# Patient Record
Sex: FEMALE | Race: WHITE | Employment: FULL TIME | URBAN - METROPOLITAN AREA
[De-identification: names, ages, dates, MRNs, and addresses within clinical notes are randomized per-mention and may not be internally consistent; named-entity substitution may affect disease eponyms.]

---

## 2017-01-11 LAB
CREATININE, EXTERNAL: 0.6
HBA1C MFR BLD HPLC: 5.4 %

## 2017-01-12 ENCOUNTER — OFFICE VISIT (OUTPATIENT)
Dept: INTERNAL MEDICINE CLINIC | Age: 62
End: 2017-01-12

## 2017-01-12 VITALS
DIASTOLIC BLOOD PRESSURE: 80 MMHG | HEIGHT: 64 IN | OXYGEN SATURATION: 100 % | WEIGHT: 181.2 LBS | RESPIRATION RATE: 16 BRPM | TEMPERATURE: 98.4 F | BODY MASS INDEX: 30.93 KG/M2 | SYSTOLIC BLOOD PRESSURE: 100 MMHG | HEART RATE: 65 BPM

## 2017-01-12 DIAGNOSIS — Z11.59 NEED FOR HEPATITIS C SCREENING TEST: ICD-10-CM

## 2017-01-12 DIAGNOSIS — I10 ESSENTIAL HYPERTENSION, BENIGN: ICD-10-CM

## 2017-01-12 DIAGNOSIS — R73.01 IFG (IMPAIRED FASTING GLUCOSE): Primary | ICD-10-CM

## 2017-01-12 RX ORDER — METFORMIN HYDROCHLORIDE 500 MG/1
TABLET, FILM COATED, EXTENDED RELEASE ORAL
COMMUNITY

## 2017-01-12 RX ORDER — TRAZODONE HYDROCHLORIDE 100 MG/1
100 TABLET ORAL
COMMUNITY

## 2017-01-12 NOTE — MR AVS SNAPSHOT
Visit Information Date & Time Provider Department Dept. Phone Encounter #  
 1/12/2017  8:30 AM Jigna Gutierrez, [address] [facility] [phone] [id number] Follow-up Instructions Return if symptoms worsen or fail to improve. Upcoming Health Maintenance Date Due Hepatitis C Screening 1955 Pneumococcal 19-64 Medium Risk (1 of 1 - PPSV23) 12/18/1976 PAP AKA CERVICAL CYTOLOGY 12/23/2017 BREAST CANCER SCRN MAMMOGRAM 1/5/2018 COLONOSCOPY 11/26/2019 DTaP/Tdap/Td series (2 - Td) 1/3/2022 Allergies as of 1/12/2017  Review Complete On: 1/12/2017 By: Brie Harris Severity Noted Reaction Type Reactions Neosporin [Benzalkonium Chloride] High 01/12/2017    Rash Facial rash, eye swelling Other Medication  10/14/2009    Nausea and Vomiting, Other (comments) SCALLOPS Current Immunizations  Reviewed on 1/3/2012 Name Date TDAP Vaccine 1/3/2012 Not reviewed this visit You Were Diagnosed With   
  
 Codes Comments IFG (impaired fasting glucose)    -  Primary ICD-10-CM: R73.01 
ICD-9-CM: 790.21 Need for hepatitis C screening test     ICD-10-CM: Z11.59 
ICD-9-CM: V73.89 Essential hypertension, benign     ICD-10-CM: I10 
ICD-9-CM: 401.1 Vitals BP Pulse Temp Resp Height(growth percentile) Weight(growth percentile) 100/80 (BP 1 Location: Right arm, BP Patient Position: Sitting) 65 98.4 °F (36.9 °C) (Oral) 16 5' 3.78\" (1.62 m) 181 lb 3.2 oz (82.2 kg) SpO2 BMI OB Status Smoking Status 100% 31.32 kg/m2 Menopause Never Smoker BMI and BSA Data Body Mass Index Body Surface Area  
 31.32 kg/m 2 1.92 m 2 Preferred Pharmacy Pharmacy Name Phone [id number] [place], [address] [phone] Your Updated Medication List  
  
   
This list is accurate as of: 1/12/17 10:06 AM.  Always use your most recent med list.  
  
  
  
  
 BENICAR 40 mg tablet Generic drug:  olmesartan Take 20 mg by mouth every morning. doxycycline 100 mg capsule Commonly known as:  Jaison Jacobsonon Take 100 mg by mouth two (2) times a day. Indications: LYME DISEASE PREVENTION  
  
 DUAC 1.2 %(1 % base) -5 % SR topical gel Generic drug:  clindamycin-benzoyl Peroxide  
as needed. hydroCHLOROthiazide 25 mg tablet Commonly known as:  HYDRODIURIL Take 25 mg by mouth every morning. LORazepam 1 mg tablet Commonly known as:  ATIVAN Take 1 tablet by mouth daily as needed for Anxiety. metFORMIN 500 mg Tg24 24 hour tablet Commonly known asChaneta Lie ER Take  by mouth. MULTIVITAMIN PO Take 1 Tab by mouth every morning. simvastatin 20 mg tablet Commonly known as:  ZOCOR Take 1 Tab by mouth nightly. traZODone 100 mg tablet Commonly known as:  Pacheco Felix Take 100 mg by mouth nightly. valACYclovir 1 gram tablet Commonly known as:  VALTREX Take 500 mg by mouth as needed. VITAMIN D3 2,000 unit Tab Generic drug:  cholecalciferol (vitamin D3) Take 4,000 Units by mouth daily. Follow-up Instructions Return if symptoms worsen or fail to improve. Introducing [facility] HE[facility]! Sean Alonzo introduces [employer] patient portal. Now you can access parts of your medical record, email your doctor's office, and request medication refills online. 1. In your internet browser, go to [email or web address]. [email or web address] 2. Click on the First Time User? Click Here link in the Sign In box. You will see the New Member Sign Up page. 3. Enter your [employer] Access Code exactly as it appears below. You will not need to use this code after youve completed the sign-up process. If you do not sign up before the expiration date, you must request a new code. · [employer] Access Code: [email or web address] Expires: 4/12/2017 10:06 AM 
 
 4. Enter the last four digits of your Social Security Number (xxxx) and Date of Birth (mm/dd/yyyy) as indicated and click Submit. You will be taken to the next sign-up page. 5. Create a Video Passports ID. This will be your [employer] Pass[employer] login ID and cannot be changed, so think of one that is secure and easy to remember. 6. Create a [employer] Pass[employer] password. You can change your password at any time. 7. Enter your Password Reset Question and Answer. This can be used at a later time if you forget your password. 8. Enter your e-mail address. You will receive e-mail notification when new information is available in [address] 9. Click Sign Up. You can now view and download portions of your medical record. 10. Click the Download Summary menu link to download a portable copy of your medical information. If you have questions, please visit the Frequently Asked Questions section of the [employer] website. Remember, [employer] is NOT to be used for urgent needs. For medical emergencies, dial 911. Now available from your iPhone and Android! Please provide this summary of care documentation to your next provider. Your primary care clinician is listed as MAAME RENTERIA. If you have any questions after today's visit, please call [phone].

## 2017-01-15 NOTE — PROGRESS NOTES
HISTORY OF PRESENT ILLNESS  Denisse Bonner is a 63 y.o. female. HPI Ramonita is seen today for follow up of blood sugar abnormalities. She has been doing well in general.    IFG vs diabetes. She has been started on Metformin by her gynecologist, Dr. Dominga Abreu. We received lab work and her A1c was 5.4%. Ramonita is hesitant to increase the dosage of the medication to (2) nightly, currently she is on (1). She denies any reaction and does feel better. I asked her to continue with this current regimen of (1) Metformin daily and return to see Dr. Dominga Abreu as directed. She has had symptoms consistent with vasomotor lability. She wonders if this is related to her blood sugar. She does not want to do fingerstick testing. Essential hypertension. Up to date with cardiology follow up. Review of systems notable for some floaters and poor sleep pattern. Dr. Dominga Abreu has prescribed Trazodone. We counseled for 20 minutes regarding blood sugar abnormalities, a 25 minute visit overall greater than 50% of the visit was spent in counseling. Reviewed medication recommendations as above. MedDATA/gwo           Review of Systems   Constitutional: Positive for weight loss. Eyes: Negative for blurred vision. Endo/Heme/Allergies: Negative for polydipsia. Physical Exam   Constitutional: No distress. Neurological: She is alert. Skin: Skin is warm and dry. Psychiatric: She has a normal mood and affect. Her behavior is normal.   Nursing note and vitals reviewed. ASSESSMENT and PLAN  Haley Wesley was seen today for medication evaluation. Diagnoses and all orders for this visit:    IFG (impaired fasting glucose)- Diet and exercise , See gynecologist as discussed/directed     Need for hepatitis C screening test    Essential hypertension, benign- See cardiologist as directed.      Other orders  -     Cancel: METABOLIC PANEL, COMPREHENSIVE  -     Cancel: CBC WITH AUTOMATED DIFF  -     Cancel: HEPATITIS C AB    This has been fully explained to the patient, who indicates understanding.

## 2017-08-09 ENCOUNTER — OFFICE VISIT (OUTPATIENT)
Dept: INTERNAL MEDICINE CLINIC | Age: 62
End: 2017-08-09

## 2017-08-09 ENCOUNTER — TELEPHONE (OUTPATIENT)
Dept: INTERNAL MEDICINE CLINIC | Age: 62
End: 2017-08-09

## 2017-08-09 VITALS
RESPIRATION RATE: 18 BRPM | SYSTOLIC BLOOD PRESSURE: 100 MMHG | BODY MASS INDEX: 29.16 KG/M2 | OXYGEN SATURATION: 99 % | TEMPERATURE: 98.4 F | DIASTOLIC BLOOD PRESSURE: 80 MMHG | HEART RATE: 63 BPM | WEIGHT: 170.8 LBS | HEIGHT: 64 IN

## 2017-08-09 DIAGNOSIS — L98.8 AGE-RELATED FACIAL WRINKLES: ICD-10-CM

## 2017-08-09 DIAGNOSIS — Z01.810 PRE-OPERATIVE CARDIOVASCULAR EXAMINATION: Primary | ICD-10-CM

## 2017-08-09 DIAGNOSIS — I10 ESSENTIAL HYPERTENSION, BENIGN: ICD-10-CM

## 2017-08-09 RX ORDER — BUPROPION HYDROCHLORIDE 150 MG/1
150 TABLET ORAL
COMMUNITY

## 2017-08-09 NOTE — TELEPHONE ENCOUNTER
----- Message from Isabelle Moore MD sent at 8/9/2017 10:11 AM EDT -----  Regarding: acs  Request faxes  - Dr Georgina Teran, cardiology, latest lab. May be [facility]. .... Called Dr Julio César Mcknight office - spoke with Richie Butler - requesting pt's latest labs. She will send request to nurse to fax today. Called Dr Rajan's office - spoke with  Johnie Ybarra - she states pt last labs were done back in June of 2010. Her last appt was in October 2016 but no labs done.    Will forward to MD.

## 2017-08-09 NOTE — LETTER
8/9/2017 10:15 AM 
 
Ms. Adri Rao [address] [place] [id number] Dear Delfino Hammans,  
     Many thanks for your help with Missy's care. Please see my enclosed consult note for specific recommendations. If I may help in any way, please feel free to call. Sincerely, Karlos Colon MD

## 2017-08-09 NOTE — PROGRESS NOTES
HISTORY OF PRESENT ILLNESS  Urmila Irizarry is a 63 y.o. female. HPI PREOPERATIVE CONSULTATION NOTE    Ramonita is seen today at the request of Dr. Leonard Choudhary for medical clearance prior to proposed cosmetic surgery. She is going to have the loose skin of her neck tightened. Surgery is scheduled for 8/28/17. Ramonita has been medically stable. She has had no hospitalizations, emergency room visit or serious illnesses. She had a calcium score done with her cardiologist and it was zero. He manages her blood pressure and it has been fine. She denies any cardiac symptoms such as shortness of breath or chest pain. Her only new problem is a recent diagnosis of insulin resistance for which she takes Metformin. This was managed by her gynecologist, Dr. Yamilet Gregory. Assessment:  She is at low risk for medical complication based on her current clinical stability as well as the nature of the proposed procedure. Recommendations:  -Follow anesthesia recommendations for medications specifically I would advise not taking HCTZ the morning of surgery as well as holding the dinnertime Metformin the night before surgery. She expresses an understanding.  -Call if I may help with any postoperative medical problem.  -I have requested recent lab studies from Dr. Yamilet Gregory.  -I requested her latest EKG from her cardiologist.   -Call if I may help with any postoperative medical questions or problems.  -Lazarus Grace sees me on an as-needed basis and will let me know if she has any need for further follow up. Otherwise, she will continue to see her regular specialists as directed.     MedDATA/gwo         Past Medical History:   Diagnosis Date    Arthritis     Environmental allergies     Fatty liver     biopst proven    Hypertension     Obesity, morbid ([address]     Other and unspecified hyperlipidemia     Shingles     Unspecified adverse effect of anesthesia 2009    DIFFICULTY AWAKENING, R/T DILAUDID     Past Surgical History:   Procedure Laterality Date    HX CHOLECYSTECTOMY      HX COLONOSCOPY  14    due 23    HX GI      liver biopsy    HX GI  2008    lap band    HX GI  2009    lap band removal    HX ORTHOPAEDIC  2013    right foot , tendon release    HX TONSILLECTOMY       Current Outpatient Prescriptions   Medication Sig    buPROPion XL (WELLBUTRIN XL) 150 mg tablet Take 150 mg by mouth every morning.  traZODone (DESYREL) 100 mg tablet Take 100 mg by mouth nightly.  metFORMIN (GLUMETZA ER) 500 mg TG24 24 hour tablet Take  by mouth.  LORazepam (ATIVAN) 1 mg tablet Take 1 tablet by mouth daily as needed for Anxiety.  doxycycline (MONODOX) 100 mg capsule Take 100 mg by mouth two (2) times a day. Indications: LYME DISEASE PREVENTION    cholecalciferol, vitamin D3, (VITAMIN D3) 2,000 unit tab Take 4,000 Units by mouth daily.  valACYclovir (VALTREX) 1 g tablet Take 500 mg by mouth as needed.  DUAC 1-5 % topical gel as needed.  olmesartan (BENICAR) 40 mg tablet Take 20 mg by mouth every morning.  hydrochlorothiazide (HYDRODIURIL) 25 mg tablet Take 25 mg by mouth every morning.  simvastatin (ZOCOR) 20 mg tablet Take 1 Tab by mouth nightly.  MULTIVITAMINS (MULTIVITAMIN PO) Take 1 Tab by mouth every morning. No current facility-administered medications for this visit. Allergies   Allergen Reactions    Neosporin [Benzalkonium Chloride] Rash     Facial rash, eye swelling    Other Medication Nausea and Vomiting and Other (comments)     SCALLOPS     Social History     Social History    Marital status: SINGLE     Spouse name: N/A    Number of children: N/A    Years of education: N/A     Occupational History    Not on file.      Social History Main Topics    Smoking status: Never Smoker    Smokeless tobacco: Never Used    Alcohol use Yes      Comment: occassionally    Drug use: No    Sexual activity: Not on file     Other Topics Concern    Not on file     Social History Narrative     Family History Problem Relation Age of Onset    Arthritis-osteo Mother     Heart Disease Father     Diabetes Father     Hypertension Father     Hypertension Brother     Anesth Problems Neg Hx          Review of Systems   Constitutional: Positive for weight loss. With Diet and exercise    Respiratory: Negative. Cardiovascular: Negative for chest pain, palpitations, leg swelling and PND. Gastrointestinal: Negative. Genitourinary: Negative. Musculoskeletal: Negative for myalgias. Neurological: Negative for focal weakness. Physical Exam   Constitutional: She is oriented to person, place, and time. She appears well-developed and well-nourished. No distress. HENT:   Head: Normocephalic and atraumatic. Right Ear: Tympanic membrane, external ear and ear canal normal.   Left Ear: Tympanic membrane, external ear and ear canal normal.   Eyes: EOM are normal. Pupils are equal, round, and reactive to light. Right eye exhibits no discharge. Left eye exhibits no discharge. Neck: Normal range of motion. Neck supple. Carotid bruit is not present. No thyromegaly present. Cardiovascular: Normal rate, regular rhythm, normal heart sounds and intact distal pulses. Exam reveals no gallop and no friction rub. No murmur heard. Pulmonary/Chest: Effort normal and breath sounds normal. No respiratory distress. She has no wheezes. She has no rales. Abdominal: Soft. Bowel sounds are normal. She exhibits no distension and no mass. There is no tenderness. There is no rebound and no guarding. Musculoskeletal: Normal range of motion. She exhibits no edema or tenderness. Lymphadenopathy:     She has no cervical adenopathy. Neurological: She is alert and oriented to person, place, and time. She has normal reflexes. Skin: Skin is warm and dry. No rash noted. Psychiatric: She has a normal mood and affect. Her behavior is normal.   Nursing note and vitals reviewed.       ASSESSMENT and PLAN  Diagnoses and all orders for this visit:    1. Pre-operative cardiovascular examination- as above    2. Essential hypertension, benign- Continue current regimen of prescription and / or OTC medications, See cardiologist as directed.      3. Age-related facial wrinkles - See surgeon as discussed/directed

## 2017-08-09 NOTE — MR AVS SNAPSHOT
Visit Information Date & Time Provider Department Dept. Phone Encounter #  
 8/9/2017  9:20 AM Ruben Mata MD [facility] [phone] [id number] Upcoming Health Maintenance Date Due Hepatitis C Screening 1955 Pneumococcal 19-64 Medium Risk (1 of 1 - PPSV23) 12/18/1976 INFLUENZA AGE 9 TO ADULT 8/1/2017 PAP AKA CERVICAL CYTOLOGY 12/23/2017 BREAST CANCER SCRN MAMMOGRAM 1/19/2019 COLONOSCOPY 11/26/2019 DTaP/Tdap/Td series (2 - Td) 1/3/2022 Allergies as of 8/9/2017  Review Complete On: 8/9/2017 By: Ruben Mata MD  
  
 Severity Noted Reaction Type Reactions Neosporin [Benzalkonium Chloride] High 01/12/2017    Rash Facial rash, eye swelling Other Medication  10/14/2009    Nausea and Vomiting, Other (comments) SCALLOPS Current Immunizations  Reviewed on 1/3/2012 Name Date TDAP Vaccine 1/3/2012 Not reviewed this visit You Were Diagnosed With   
  
 Codes Comments Pre-operative cardiovascular examination    -  Primary ICD-10-CM: Z01.810 ICD-9-CM: V72.81 Essential hypertension, benign     ICD-10-CM: I10 
ICD-9-CM: 401.1 Age-related facial wrinkles     ICD-10-CM: L90.8 ICD-9-CM: 701.8 Vitals BP Pulse Temp Resp Height(growth percentile) Weight(growth percentile) 100/80 (BP 1 Location: Right arm, BP Patient Position: Sitting) 63 98.4 °F (36.9 °C) (Oral) 18 5' 3.78\" (1.62 m) 170 lb 12.8 oz (77.5 kg) SpO2 BMI OB Status Smoking Status 99% 29.52 kg/m2 Menopause Never Smoker BMI and BSA Data Body Mass Index Body Surface Area  
 29.52 kg/m 2 1.87 m 2 Preferred Pharmacy Pharmacy Name Phone [id number] [place] [phone] Your Updated Medication List  
  
   
This list is accurate as of: 8/9/17 10:03 AM.  Always use your most recent med list.  
  
  
  
  
 BENICAR 40 mg tablet Generic drug:  olmesartan Take 20 mg by mouth every morning. doxycycline 100 mg capsule Commonly known as:  Geraline Block Take 100 mg by mouth two (2) times a day. Indications: LYME DISEASE PREVENTION  
  
 DUAC 1.2 %(1 % base) -5 % SR topical gel Generic drug:  clindamycin-benzoyl Peroxide  
as needed. hydroCHLOROthiazide 25 mg tablet Commonly known as:  HYDRODIURIL Take 25 mg by mouth every morning. LORazepam 1 mg tablet Commonly known as:  ATIVAN Take 1 tablet by mouth daily as needed for Anxiety. metFORMIN 500 mg Tg24 24 hour tablet Commonly known asLuane Bragg ER Take  by mouth. MULTIVITAMIN PO Take 1 Tab by mouth every morning. simvastatin 20 mg tablet Commonly known as:  ZOCOR Take 1 Tab by mouth nightly. traZODone 100 mg tablet Commonly known as:  Donneta Ansonia Take 100 mg by mouth nightly. valACYclovir 1 gram tablet Commonly known as:  VALTREX Take 500 mg by mouth as needed. VITAMIN D3 2,000 unit Tab Generic drug:  cholecalciferol (vitamin D3) Take 4,000 Units by mouth daily. WELLBUTRIN  mg tablet Generic drug:  buPROPion XL Take 150 mg by mouth every morning. Introducing \[facility]\"" & HEALTH SERVICES! [employer] introduces [employer] patient portal. Now you can access parts of your medical record, email your doctor's office, and request medication refills online. 1. In your internet browser, go to [email or web address] 2. Click on the First Time User? Click Here link in the Sign In box. You will see the New Member Sign Up page. 3. Enter your [employer] Access Code exactly as it appears below. You will not need to use this code after youve completed the sign-up process. If you do not sign up before the expiration date, you must request a new code. · [employer] Access Code: [email or web address] Expires: 11/7/2017 10:03 AM 
 
 4. Enter the last four digits of your Social Security Number (xxxx) and Date of Birth (mm/dd/yyyy) as indicated and click Submit. You will be taken to the next sign-up page. 5. Create a Grow[employer] ID. This will be your [employer] login ID and cannot be changed, so think of one that is secure and easy to remember. 6. Create a [employer] password. You can change your password at any time. 7. Enter your Password Reset Question and Answer. This can be used at a later time if you forget your password. 8. Enter your e-mail address. You will receive e-mail notification when new information is available in [address] 9. Click Sign Up. You can now view and download portions of your medical record. 10. Click the Download Summary menu link to download a portable copy of your medical information. If you have questions, please visit the Frequently Asked Questions section of the [employer] website. Remember, [employer] is NOT to be used for urgent needs. For medical emergencies, dial 911. Now available from your iPhone and Android! Please provide this summary of care documentation to your next provider. Your primary care clinician is listed as MAAME RENTERIA. If you have any questions after today's visit, please call [phone].

## 2017-08-09 NOTE — TELEPHONE ENCOUNTER
Called Dr Rajan's office - spoke with [facility] - requesting pt recent EKG. Was last done at her October 2016 appt. She will fax office note and EKG today.  Will forward to MD

## 2017-08-11 ENCOUNTER — DOCUMENTATION ONLY (OUTPATIENT)
Dept: INTERNAL MEDICINE CLINIC | Age: 62
End: 2017-08-11

## 2018-03-14 ENCOUNTER — HOSPITAL ENCOUNTER (OUTPATIENT)
Dept: ULTRASOUND IMAGING | Age: 63
Discharge: HOME | End: 2018-03-14
Attending: INTERNAL MEDICINE
Payer: COMMERCIAL

## 2018-03-14 DIAGNOSIS — E04.1 NONTOXIC UNINODULAR GOITER: ICD-10-CM

## 2018-03-14 PROCEDURE — 76536 US EXAM OF HEAD AND NECK: CPT

## 2019-05-10 ENCOUNTER — HOSPITAL ENCOUNTER (OUTPATIENT)
Dept: ULTRASOUND IMAGING | Age: 64
Discharge: HOME | End: 2019-05-10
Attending: INTERNAL MEDICINE
Payer: COMMERCIAL

## 2019-05-10 DIAGNOSIS — E04.2 MULTINODULAR THYROID: ICD-10-CM

## 2019-05-10 PROCEDURE — 76536 US EXAM OF HEAD AND NECK: CPT

## 2020-03-10 ENCOUNTER — HOSPITAL ENCOUNTER (OUTPATIENT)
Dept: ULTRASOUND IMAGING | Age: 65
Discharge: HOME | End: 2020-03-10
Attending: INTERNAL MEDICINE
Payer: COMMERCIAL

## 2020-03-10 DIAGNOSIS — E04.2 NONTOXIC MULTINODULAR GOITER: ICD-10-CM

## 2020-03-10 PROCEDURE — 76536 US EXAM OF HEAD AND NECK: CPT

## 2021-10-20 ENCOUNTER — TRANSCRIBE ORDER (OUTPATIENT)
Dept: SCHEDULING | Age: 66
End: 2021-10-20

## 2021-10-20 DIAGNOSIS — M84.369A STRESS FRACTURE OF TIBIA: Primary | ICD-10-CM

## 2021-10-28 ENCOUNTER — HOSPITAL ENCOUNTER (OUTPATIENT)
Dept: MRI IMAGING | Age: 66
Discharge: HOME | End: 2021-10-28
Attending: ORTHOPAEDIC SURGERY
Payer: COMMERCIAL

## 2021-10-28 DIAGNOSIS — M84.369A STRESS FRACTURE OF TIBIA: ICD-10-CM

## 2021-10-28 PROCEDURE — 73721 MRI JNT OF LWR EXTRE W/O DYE: CPT

## 2021-11-15 ENCOUNTER — OFFICE VISIT (OUTPATIENT)
Dept: ORTHOPEDIC SURGERY | Age: 66
End: 2021-11-15
Payer: COMMERCIAL

## 2021-11-15 DIAGNOSIS — S83.221A: ICD-10-CM

## 2021-11-15 DIAGNOSIS — S83.261A PERIPHERAL TEAR OF LATERAL MENISCUS OF RIGHT KNEE AS CURRENT INJURY, INITIAL ENCOUNTER: Primary | ICD-10-CM

## 2021-11-15 PROCEDURE — 99214 OFFICE O/P EST MOD 30 MIN: CPT | Performed by: ORTHOPAEDIC SURGERY

## 2021-11-15 NOTE — PROGRESS NOTES
Bettina Loco (: 1955) is a 63 y.o. female, patient, here for evaluation of the following chief complaint(s):  Knee Pain (right knee MRI)       HPI:    Patient returns to the office with recurrent discomfort of her right knee. She once again describes discomfort seems to be mostly located across the lateral aspect of the right knee. Allergies   Allergen Reactions    Neosporin [Benzalkonium Chloride] Rash     Facial rash, eye swelling    Other Medication Nausea and Vomiting and Other (comments)     SCALLOPS       Current Outpatient Medications   Medication Sig    buPROPion XL (WELLBUTRIN XL) 150 mg tablet Take 150 mg by mouth every morning.  traZODone (DESYREL) 100 mg tablet Take 100 mg by mouth nightly.  metFORMIN (GLUMETZA ER) 500 mg TG24 24 hour tablet Take  by mouth.  LORazepam (ATIVAN) 1 mg tablet Take 1 tablet by mouth daily as needed for Anxiety.  doxycycline (MONODOX) 100 mg capsule Take 100 mg by mouth two (2) times a day. Indications: LYME DISEASE PREVENTION    cholecalciferol, vitamin D3, (VITAMIN D3) 2,000 unit tab Take 4,000 Units by mouth daily.  valACYclovir (VALTREX) 1 g tablet Take 500 mg by mouth as needed.  DUAC 1-5 % topical gel as needed.  olmesartan (BENICAR) 40 mg tablet Take 20 mg by mouth every morning.  hydrochlorothiazide (HYDRODIURIL) 25 mg tablet Take 25 mg by mouth every morning.  simvastatin (ZOCOR) 20 mg tablet Take 1 Tab by mouth nightly.  MULTIVITAMINS (MULTIVITAMIN PO) Take 1 Tab by mouth every morning. No current facility-administered medications for this visit.        Past Medical History:   Diagnosis Date    Arthritis     Environmental allergies     Fatty liver     biopst proven    Hypertension     Obesity, morbid ([address]     Other and unspecified hyperlipidemia     Shingles     Unspecified adverse effect of anesthesia     DIFFICULTY AWAKENING, R/T DILAUDID        Past Surgical History:   Procedure Laterality Date  HX CHOLECYSTECTOMY      HX COLONOSCOPY  14    due 25    HX GI      liver biopsy    HX GI  2008    lap band    HX GI  2009    lap band removal    HX ORTHOPAEDIC  2013    right foot , tendon release    HX TONSILLECTOMY         Family History   Problem Relation Age of Onset    Arthritis-osteo Mother     Heart Disease Father     Diabetes Father     Hypertension Father     Hypertension Brother     Anesth Problems Neg Hx         Social History     Socioeconomic History    Marital status:      Spouse name: Not on file    Number of children: Not on file    Years of education: Not on file    Highest education level: Not on file   Occupational History    Not on file   Tobacco Use    Smoking status: Never Smoker    Smokeless tobacco: Never Used   Substance and Sexual Activity    Alcohol use: Yes     Comment: occassionally    Drug use: No    Sexual activity: Not on file   Other Topics Concern    Not on file   Social History Narrative    Not on file     Social Determinants of Health     Financial Resource Strain:     Difficulty of Paying Living Expenses: Not on file   Food Insecurity:     Worried About Running Out of Food in the Last Year: Not on file    [place] of Food in the Last Year: Not on file   Transportation Needs:     Lack of Transportation (Medical): Not on file    Lack of Transportation (Non-Medical):  Not on file   Physical Activity:     Days of Exercise per Week: Not on file    Minutes of Exercise per Session: Not on file   Stress:     Feeling of Stress : Not on file   Social Connections:     Frequency of Communication with Friends and Family: Not on file    Frequency of Social Gatherings with Friends and Family: Not on file    Attends Buddhist Services: Not on file    Active Member of Clubs or Organizations: Not on file    Attends Club or Organization Meetings: Not on file    Marital Status: Not on file   Intimate Partner Violence:     Fear of Current or Ex-Partner: Not on file    Emotionally Abused: Not on file    Physically Abused: Not on file    Sexually Abused: Not on file   Housing Stability:     Unable to Pay for Housing in the Last Year: Not on file    Number of Places Lived in the Last Year: Not on file    Unstable Housing in the Last Year: Not on file       Review of Systems   Constitutional: Negative. HENT: Negative. Eyes: Negative. Respiratory: Negative. Cardiovascular: Negative. Gastrointestinal: Negative. Endocrine: Negative. Genitourinary: Negative. Musculoskeletal: Negative. Shoulder MRI results   Skin: Negative. Allergic/Immunologic: Negative. Neurological: Negative. Hematological: Negative. Psychiatric/Behavioral: Negative. All other systems reviewed and are negative. Vitals: There were no vitals taken for this visit. There is no height or weight on file to calculate BMI. Ortho Exam     Right knee: Minimal effusion. She has full range of motion. Positive discomfort is noted to palpation along the lateral aspect of the knee. Patient also notes some mild discomfort along the medial aspect of the knee. No swelling noted. Neurovascular examination is intact. Left knee: no abrasions, lacerations, ecchymosis or soft tissue swelling. No effusion is identified. There is no pain to palpation along the medial or lateral border of the patella. There is no pain or crepitation with manipulation of the patella. There is normal excursion of the patella. Patellar grind test is negative. Active and passive range of motion is full and does not cause pain or crepitation. There is no pain with palpation along the medial femoral epicondyle or medial tibia and no pain with palpation over the lateral femoral epicondyle. There is no medial or lateral joint line tenderness. Gabe's maneuver is negative. There is no collateral ligament instability.   Anterior drawer, Lachman and posterior drawer are negative. There is no soft tissue swelling distally into the leg. MRI evaluation shows evidence of a horizontal degenerative tear with parameniscal cyst of the lateral meniscus. There also appears to be potentially radial tear of the posterior horn root of the medial meniscus    ASSESSMENT/PLAN:    Have gone over the findings with the patient. We discussed operative versus nonoperative management. Understand the risk and benefits of both, she is leaning a little bit more towards surgical management. I think it is reasonable. However, she would like to get through the holiday season. In the interim, vascular to ice and modify her activity. We did talk about surgical risks and benefits. The risks and benefits were described to the patient. The patient understands there is a risk of infection, postoperative pain, numbness, tingling, stiffness DVT, PE, MI, CVA and any other unforeseen events. The patient also understands there is a long rehabilitative process that typically follows the surgical procedure. We talked about the possibility of not being able to alleviate all of the discomfort. Also, I explained  there is no guarantee all function and strength will return. The patient understands the possiblity that  implants may be utilized during this surgery. If her pain gets worse, have asked her to call the office and potentially set up the surgery. I did remind her, if her medial meniscus does need work I would recommend medial meniscus root repair if the root is torn and this will require bracing. She understands this moving forward.       Carmen Fox MD

## 2021-12-03 DIAGNOSIS — S83.261A PERIPHERAL TEAR OF LATERAL MENISCUS OF RIGHT KNEE AS CURRENT INJURY, INITIAL ENCOUNTER: Primary | ICD-10-CM

## 2021-12-27 DIAGNOSIS — S83.261A PERIPHERAL TEAR OF LATERAL MENISCUS OF RIGHT KNEE AS CURRENT INJURY, INITIAL ENCOUNTER: Primary | ICD-10-CM

## 2021-12-27 RX ORDER — OXYCODONE AND ACETAMINOPHEN 5; 325 MG/1; MG/1
1 TABLET ORAL
Qty: 30 TABLET | Refills: 0 | Status: SHIPPED | OUTPATIENT
Start: 2021-12-27 | End: 2022-01-01

## 2022-01-03 ENCOUNTER — OFFICE VISIT (OUTPATIENT)
Dept: ORTHOPEDIC SURGERY | Age: 67
End: 2022-01-03
Payer: COMMERCIAL

## 2022-01-03 DIAGNOSIS — Z98.890 STATUS POST ARTHROSCOPY OF RIGHT KNEE: Primary | ICD-10-CM

## 2022-01-03 PROCEDURE — 99024 POSTOP FOLLOW-UP VISIT: CPT | Performed by: ORTHOPAEDIC SURGERY

## 2022-01-03 NOTE — PROGRESS NOTES
Rashawn Sierra (: 1955) is a 61 y.o. female, patient, here for evaluation of the following chief complaint(s):  No chief complaint on file. HPI:    Patient returns the office now status post right knee arthroscopy. Patient is doing well her pain is controlled. She is here today with her . She denies shortness of breath or calf pain. Allergies   Allergen Reactions    Neosporin [Benzalkonium Chloride] Rash     Facial rash, eye swelling    Other Medication Nausea and Vomiting and Other (comments)     SCALLOPS       Current Outpatient Medications   Medication Sig    buPROPion XL (WELLBUTRIN XL) 150 mg tablet Take 150 mg by mouth every morning.  traZODone (DESYREL) 100 mg tablet Take 100 mg by mouth nightly.  metFORMIN (GLUMETZA ER) 500 mg TG24 24 hour tablet Take  by mouth.  LORazepam (ATIVAN) 1 mg tablet Take 1 tablet by mouth daily as needed for Anxiety.  doxycycline (MONODOX) 100 mg capsule Take 100 mg by mouth two (2) times a day. Indications: LYME DISEASE PREVENTION    cholecalciferol, vitamin D3, (VITAMIN D3) 2,000 unit tab Take 4,000 Units by mouth daily.  valACYclovir (VALTREX) 1 g tablet Take 500 mg by mouth as needed.  DUAC 1-5 % topical gel as needed.  olmesartan (BENICAR) 40 mg tablet Take 20 mg by mouth every morning.  hydrochlorothiazide (HYDRODIURIL) 25 mg tablet Take 25 mg by mouth every morning.  simvastatin (ZOCOR) 20 mg tablet Take 1 Tab by mouth nightly.  MULTIVITAMINS (MULTIVITAMIN PO) Take 1 Tab by mouth every morning. No current facility-administered medications for this visit.        Past Medical History:   Diagnosis Date    Arthritis     Environmental allergies     Fatty liver     biopst proven    Hypertension     Obesity, morbid ([facility] [address]     Other and unspecified hyperlipidemia     Shingles     Unspecified adverse effect of anesthesia     DIFFICULTY AWAKENING, R/T DILAUDID        Past Surgical History:   Procedure Laterality Date    HX CHOLECYSTECTOMY      HX COLONOSCOPY  14    due 25    HX GI      liver biopsy    HX GI  2008    lap band    HX GI  2009    lap band removal    HX ORTHOPAEDIC  2013    right foot , tendon release    HX TONSILLECTOMY         Family History   Problem Relation Age of Onset    OSTEOARTHRITIS Mother     Heart Disease Father     Diabetes Father     Hypertension Father     Hypertension Brother     Anesth Problems Neg Hx         Social History     Socioeconomic History    Marital status:      Spouse name: Not on file    Number of children: Not on file    Years of education: Not on file    Highest education level: Not on file   Occupational History    Not on file   Tobacco Use    Smoking status: Never Smoker    Smokeless tobacco: Never Used   Substance and Sexual Activity    Alcohol use: Yes     Comment: occassionally    Drug use: No    Sexual activity: Not on file   Other Topics Concern    Not on file   Social History Narrative    Not on file     Social Determinants of Health     Financial Resource Strain:     Difficulty of Paying Living Expenses: Not on file   Food Insecurity:     Worried About Running Out of Food in the Last Year: Not on file    [place] of Food in the Last Year: Not on file   Transportation Needs:     Lack of Transportation (Medical): Not on file    Lack of Transportation (Non-Medical):  Not on file   Physical Activity:     Days of Exercise per Week: Not on file    Minutes of Exercise per Session: Not on file   Stress:     Feeling of Stress : Not on file   Social Connections:     Frequency of Communication with Friends and Family: Not on file    Frequency of Social Gatherings with Friends and Family: Not on file    Attends Voodoo Services: Not on file    Active Member of Clubs or Organizations: Not on file    Attends Club or Organization Meetings: Not on file    Marital Status: Not on file   Intimate Partner Violence:     Fear of Current or Ex-Partner: Not on file    Emotionally Abused: Not on file    Physically Abused: Not on file    Sexually Abused: Not on file   Housing Stability:     Unable to Pay for Housing in the Last Year: Not on file    Number of Places Lived in the Last Year: Not on file    Unstable Housing in the Last Year: Not on file       Review of Systems    Vitals: There were no vitals taken for this visit. There is no height or weight on file to calculate BMI. Ortho Exam     Right knee: Mild ecchymosis noted along the anterior aspect of the knee. Portal sites are healing well. Range of motion 0-120 degrees. She has no effusion. Calf is soft and supple. Neurovascular examination is intact. ASSESSMENT/PLAN:    Patient is doing well. Have gone over home exercise program.  I would also recommend structured physical therapy. She was provided a prescription for such. Patient is to return to the office in 4 weeks.         Luis Wright MD

## 2022-01-07 ENCOUNTER — OFFICE VISIT (OUTPATIENT)
Dept: ORTHOPEDIC SURGERY | Age: 67
End: 2022-01-07
Payer: COMMERCIAL

## 2022-01-07 DIAGNOSIS — M25.561 ACUTE PAIN OF RIGHT KNEE: Primary | ICD-10-CM

## 2022-01-07 DIAGNOSIS — R26.89 ANTALGIC GAIT: ICD-10-CM

## 2022-01-07 PROCEDURE — 97140 MANUAL THERAPY 1/> REGIONS: CPT | Performed by: PHYSICAL THERAPIST

## 2022-01-07 PROCEDURE — 97110 THERAPEUTIC EXERCISES: CPT | Performed by: PHYSICAL THERAPIST

## 2022-01-07 PROCEDURE — 97162 PT EVAL MOD COMPLEX 30 MIN: CPT | Performed by: PHYSICAL THERAPIST

## 2022-01-07 NOTE — PROGRESS NOTES
Devi Rocha (: 1955) is a 61 y.o. Knee Pain       Patient Name: Devi Rocha  Date:2022  : 1955  [x]  Patient  Verified  Payor: BLUE CROSS / Plan: [id number] PPO / Product Type: PPO /    Total Treatment Time (min): 70 minutes  Total Timed Codes (min): 60 minutes  1:1 Treatment Time (MC only): N/A  Visit #:  30      Treatment Area: Right knee    ASSESSMENT/PLAN:  Below is the assessment and plan developed based on review of pertinent history, physical exam, labs, studies, and medications. Patient comes in today status post right knee meniscectomy and chondroplasty on 2021 and presents with physical therapy deficits including swelling, range of motion, strength, mobility, flexibility, and balance. She will benefit from a physical therapy program to address above-mentioned deficits. Short-term goals: To become independent with today's prescribed home exercise program in 1 week. Long-term goals: To progress with a physical therapy program so the patient demonstrates functional right knee range of motion and strength allowing her to negotiate going up and down stairs and walk distances of up to 2 miles reporting no knee pain or instability in the next 4 to 8 weeks. Additionally patient will score clinically significant improvement of 20 percentage points on the lower extremity functional scale in the next 4 to 8 weeks. Patient will be seen twice a week for up to 20 visits  with focus on progressive restoration of range of motion and strength, balance, and functional mobility. Therapeutic applications will include but are not limited to:Manual therapy, joint mobilization, myofascial release, therapeutic exercises. Modalities including ultrasound and electric stimulation heat and ice. Kinesiotape and Clay taping for joint reeducation and approximation of tissue for neuromuscular reeducation. 1. Acute pain of right knee  2.  Antalgic gait      Return in about 4 days (around 1/11/2022). SUBJECTIVE:  HPI  Patient comes in today now 11 days status post right knee arthroscopy. She reports improvement since surgery. She has not been taking pain medication. She did start having more swelling and pain on Wednesday of this week and started to ice and elevate again. This has been helpful. Patient continues to work from home. She has not been driving. Does have a treadmill at her house that she is willing to use. Past medical history includes type 2 diabetes. She has lost 30 pounds in the last 3 months since changing her blood sugar medication. She is currently being seen by an endocrinologist for liver issues. Was seen last year in our clinic for rotator cuff repair. Her shoulder has done well. Please see patient's medical chart for detailed list of current medications as well as significant past medical history. OBJECTIVE:  Patient comes in today demonstrating slight antalgic gait. She demonstrates some weakness and apprehension with modified stepdown and step up testing on the right side. Menaced ability to balance on right extremity. Right knee: AROM measures 0 degrees extension and 120 degrees of flexion. There is 2.5 cm increase circumferential measurement at mid patella. Resolving ecchymosis around her knee. Sites are healing nicely with no signs of infection. Calf is nontender. Knee is stable with four-way ligamentous testing. Mild tenderness to medial joint line. Relative hypomobility to patellofemoral joint. Diminished quadriceps contraction. Flexibility restriction to quadriceps and gastroc. Lower extremity functional scale: 37/80    With today's interventions I performed patellofemoral and tibiofemoral mobilization with passive range of motion. Retrograde massage to peripatellar and quadriceps. I applied Kinesiotape around both medial and lateral patella with lengthening into quadriceps.  Manual hamstring stretch. We initiated exercises for range of motion, quad and hip strengthening, flexibility, and gait training for patient perform at home on a daily basis. Today's exercises include NuStep, straight leg raise, TKE, standing march, knee flexion stretch, and calf stretch. Ice posttreatment. An electronic signature was used to authenticate this note.   -- Gogo Boyle, PT

## 2022-01-12 ENCOUNTER — OFFICE VISIT (OUTPATIENT)
Dept: ORTHOPEDIC SURGERY | Age: 67
End: 2022-01-12
Payer: COMMERCIAL

## 2022-01-12 DIAGNOSIS — R26.89 ANTALGIC GAIT: ICD-10-CM

## 2022-01-12 DIAGNOSIS — M25.561 ACUTE PAIN OF RIGHT KNEE: Primary | ICD-10-CM

## 2022-01-12 PROCEDURE — 97110 THERAPEUTIC EXERCISES: CPT | Performed by: PHYSICAL THERAPIST

## 2022-01-12 PROCEDURE — 97140 MANUAL THERAPY 1/> REGIONS: CPT | Performed by: PHYSICAL THERAPIST

## 2022-01-12 NOTE — PROGRESS NOTES
PT DAILY TREATMENT NOTE    Patient Name: Carmen Bangura  Date:2022  : 1955  [x]  Patient  Verified  Payor: BLUE CROSS / Plan: [facility] PPO / Product Type: PPO /    Total Treatment Time (min): 60 minutes  Total Timed Codes (min): 60 minutes  Visit #: 2 of 30    Treatment Area: R Knee Post Op    ASSESSMENT/PLAN:  Relative patellofemoral joint hypomobility. I put her on the recumbent bike to help with patellofemoral joint mobility. - We will continue with physical therapy per current plan of care with progression towards functional goals. 1. Acute pain of right knee        Return in about 2 days (around 2022) for continued therapy for knee. SUBJECTIVE/OBJECTIVE:  HPI  Patient reports no difficulty with daily activities of living, she still has some pain at night. Physical Exam    Manual Interventions: (20 minutes)  Patellofemoral and tibiofemoral mobilization with passive range of motion. Retrograde massage to peripatellar and quadriceps. Passive Hamstring stretch       Therapeutic Exercise: (40 minutes)  Strength/Endurance/ADL function/Neuromuscular Reeducation/Exercises supervised and completed per the exercise flow sheet. Modalities Applied: (10 minutes)  Applied ice to the right knee post treatment.      Cotreatment by TIRSO Jacobs      PT Exercise Log         Activity/Exercise Date  22    Bike 10 minutes     Slant Board 2 minutes      Balance Board 2 minutes     TKE     3x10     Knee Flexion at the TM     2 minutes   Shuttle    2B  3x10     SLR  3x10  2#

## 2022-01-14 ENCOUNTER — OFFICE VISIT (OUTPATIENT)
Dept: ORTHOPEDIC SURGERY | Age: 67
End: 2022-01-14
Payer: COMMERCIAL

## 2022-01-14 DIAGNOSIS — R26.89 ANTALGIC GAIT: ICD-10-CM

## 2022-01-14 DIAGNOSIS — M25.561 ACUTE PAIN OF RIGHT KNEE: Primary | ICD-10-CM

## 2022-01-14 PROCEDURE — 97110 THERAPEUTIC EXERCISES: CPT | Performed by: PHYSICAL THERAPIST

## 2022-01-14 PROCEDURE — 97140 MANUAL THERAPY 1/> REGIONS: CPT | Performed by: PHYSICAL THERAPIST

## 2022-01-14 NOTE — PROGRESS NOTES
PT DAILY TREATMENT NOTE    Patient Name: Cristela Lynne  Date:2022  : 1955  [x]  Patient  Verified  Payor: BLUE CROSS / Plan: [facility] PPO / Product Type: PPO /    Total Treatment Time (min): 60 minutes  Total Timed Codes (min): 60 minutes  Visit #: 3 of 30    Treatment Area: R Knee Post Op    ASSESSMENT/PLAN:  Less knee effusion noted. Flexibility restriction to the hamstring. Patient was able to achieve 110 degrees of knee flexion following mobilizations. - We will continue with physical therapy per current plan of care with progression towards functional goals. 1. Acute pain of right knee        Return in about 5 days (around 2022) for continued therapy for knee. SUBJECTIVE/OBJECTIVE:  HPI  Patient is having less pain at rest.     Physical Exam    Manual Interventions: (20 minutes)  Patellofemoral and tibiofemoral mobilization with passive range of motion. Retrograde massage to peripatellar and quadriceps. Passive Hamstring stretch       Therapeutic Exercise: (40 minutes)  Strength/Endurance/ADL function/Neuromuscular Reeducation/Exercises supervised and completed per the exercise flow sheet. Modalities Applied:  Patient declined ice post treatment.      Cotreatment by TIRSO Willis      PT Exercise Log         Activity/Exercise Date  22    Bike 10 minutes     Slant Board 2 minutes      Balance Board 2 minutes     TKE     3x10     Knee Flexion at the TM     2 minutes   Shuttle    2B  3x10     SLR  3x10  2#   Stool Scoots   3 laps     Abd Walk Blue  3x10

## 2022-01-18 ENCOUNTER — OFFICE VISIT (OUTPATIENT)
Dept: ORTHOPEDIC SURGERY | Age: 67
End: 2022-01-18
Payer: COMMERCIAL

## 2022-01-18 DIAGNOSIS — M25.561 ACUTE PAIN OF RIGHT KNEE: Primary | ICD-10-CM

## 2022-01-18 DIAGNOSIS — R26.89 ANTALGIC GAIT: ICD-10-CM

## 2022-01-18 PROCEDURE — 97140 MANUAL THERAPY 1/> REGIONS: CPT | Performed by: PHYSICAL THERAPIST

## 2022-01-18 PROCEDURE — 97110 THERAPEUTIC EXERCISES: CPT | Performed by: PHYSICAL THERAPIST

## 2022-01-18 NOTE — PROGRESS NOTES
PT DAILY TREATMENT NOTE    Patient Name: Gennaro Santos  Date:2022  : 1955  [x]  Patient  Verified  Payor: BLUE CROSS / Plan: [facility] [id number] PPO / Product Type: PPO /    Total Treatment Time (min): 60 minutes  Total Timed Codes (min): 60 minutes  Visit #: 3 of 30    Treatment Area: R Knee Post Op    ASSESSMENT/PLAN:  Patient is progressing nicely in terms of less knee effusion, improved range of motion, and gait beginning to normalize. - We will continue with physical therapy per current plan of care with progression towards functional goals. 1. Acute pain of right knee      Return in about 3 days (around 2022) for continued therapy for knee. SUBJECTIVE/OBJECTIVE:  Knee Pain      Patient is having less pain at rest, less swelling, and the bruising is diminishing. Physical Exam    Manual Interventions: (20 minutes)  Patellofemoral and tibiofemoral mobilization with passive range of motion. Retrograde massage to peripatellar and quadriceps. Passive Hamstring stretch       Therapeutic Exercise: (40 minutes)  Strength/Endurance/ADL function/Neuromuscular Reeducation/Exercises supervised and completed per the exercise flow sheet. Modalities Applied:  Patient declined ice post treatment.      Cotreatment by TIRSO Melgar      PT Exercise Log         Activity/Exercise Date  22    Bike 10 minutes     Slant Board 2 minutes      Balance Board 2 minutes     TKE     3x10     Knee Flexion at the TM     2 minutes   Shuttle    2B  3x10     SLR  3x10  2#   Stool Scoots   3 laps     Abd Walk Blue  3x10

## 2022-01-24 ENCOUNTER — OFFICE VISIT (OUTPATIENT)
Dept: ORTHOPEDIC SURGERY | Age: 67
End: 2022-01-24
Payer: COMMERCIAL

## 2022-01-24 DIAGNOSIS — R26.89 ANTALGIC GAIT: ICD-10-CM

## 2022-01-24 DIAGNOSIS — M25.561 ACUTE PAIN OF RIGHT KNEE: Primary | ICD-10-CM

## 2022-01-24 PROCEDURE — 97110 THERAPEUTIC EXERCISES: CPT | Performed by: PHYSICAL THERAPIST

## 2022-01-24 PROCEDURE — 97140 MANUAL THERAPY 1/> REGIONS: CPT | Performed by: PHYSICAL THERAPIST

## 2022-01-24 NOTE — PROGRESS NOTES
PT DAILY TREATMENT NOTE    Patient Name: Nitza Dela Cruz  Date:2022  : 1955  [x]  Patient  Verified  Payor: BLUE CROSS / Plan: [facility] PPO / Product Type: PPO /    Total Treatment Time (min): 60 minutes  Total Timed Codes (min): 60 minutes  Visit #: 4 of 30    Treatment Area: R Knee Post Op    ASSESSMENT/PLAN:  Patient continues to have a slight antalgic gait. She is progressing well with both functional knee range of motion and strength. - We will continue with physical therapy per current plan of care with progression towards functional goals. 1. Acute pain of right knee  2. Antalgic gait      Return in about 2 days (around 2022) for continued therapy for knee. SUBJECTIVE/OBJECTIVE:  Knee Pain    Patient is still having some knee discomfort. Physical Exam    Manual Interventions: (20 minutes)  Patellofemoral and tibiofemoral mobilization with passive range of motion. Retrograde massage to peripatellar and quadriceps. Passive Hamstring stretch       Therapeutic Exercise: (40 minutes)  Strength/Endurance/ADL function/Neuromuscular Reeducation/Exercises supervised and completed per the exercise flow sheet. Modalities Applied:  Patient declined ice post treatment.      Cotreatment by Rowan Oppenheim, LPTA      PT Exercise Log         Activity/Exercise Date  22    Bike 10 minutes     Slant Board 2 minutes      Balance Board 2 minutes     TKE     3x10     Knee Flexion at the TM     2 minutes   Shuttle    2B  3x10     SLR  3x10  2#   Stool Scoots   3 laps     Abd Walk Blue  3x10                                                                                                                                                                                                                                                                                                         - We will continue with physical therapy per current plan of care with progression towards functional goals. 1. Acute pain of right knee  2. Antalgic gait      Return in about 2 days (around 1/26/2022) for continued therapy for knee. SUBJECTIVE/OBJECTIVE:  Knee Pain      Patient is having less pain at rest, less swelling, and the bruising is diminishing. Physical Exam    Manual Interventions: (20 minutes)  Patellofemoral and tibiofemoral mobilization with passive range of motion. Retrograde massage to peripatellar and quadriceps. Passive Hamstring stretch       Therapeutic Exercise: (40 minutes)  Strength/Endurance/ADL function/Neuromuscular Reeducation/Exercises supervised and completed per the exercise flow sheet. Modalities Applied:  Patient declined ice post treatment.      Cotreatment by TIRSO Rincon      PT Exercise Log         Activity/Exercise Date  01/25/22    Bike 10 minutes     Slant Board 2 minutes      Balance Board 2 minutes     TKE     3x10     Knee Flexion at the TM     2 minutes   Shuttle    2B  3x10     SLR  3x10  2#   Stool Scoots   3 laps     Abd Walk Blue  3x10

## 2022-01-26 ENCOUNTER — OFFICE VISIT (OUTPATIENT)
Dept: ORTHOPEDIC SURGERY | Age: 67
End: 2022-01-26
Payer: COMMERCIAL

## 2022-01-26 DIAGNOSIS — M25.561 ACUTE PAIN OF RIGHT KNEE: Primary | ICD-10-CM

## 2022-01-26 DIAGNOSIS — R26.89 ANTALGIC GAIT: ICD-10-CM

## 2022-01-26 PROCEDURE — 97110 THERAPEUTIC EXERCISES: CPT | Performed by: PHYSICAL THERAPIST

## 2022-01-26 PROCEDURE — 97140 MANUAL THERAPY 1/> REGIONS: CPT | Performed by: PHYSICAL THERAPIST

## 2022-01-26 NOTE — PROGRESS NOTES
PT DAILY TREATMENT NOTE    Patient Name: Rashawn Sierra  Date:2022  : 1955  [x]  Patient  Verified  Payor: BLUE CROSS / Plan: [facility] PPO / Product Type: PPO /    Total Treatment Time (min): 60 minutes  Total Timed Codes (min): 60 minutes  Visit #: 5 of 30    Treatment Area: R Knee Post Op    ASSESSMENT/PLAN:  Patient is doing well, she has fair quad activation compared to the contralateral side. Functional knee range of motion is nearing full. Patient has a follow up with Dr. Jon Boyer on Monday to determine further plan of care. - We will continue with physical therapy per current plan of care with progression towards functional goals. 1. Acute pain of right knee  2. Antalgic gait      Return in about 5 days (around 2022) for continued therapy for knee. SUBJECTIVE/OBJECTIVE:  Knee Pain    Patient is starting to feel much better. Physical Exam    Manual Interventions: (20 minutes)  Patellofemoral and tibiofemoral mobilization with passive range of motion. Retrograde massage to peripatellar and quadriceps. Passive Hamstring stretch. AROM: 120 degrees of knee flexion and 0 of knee extension. Therapeutic Exercise: (40 minutes)  Strength/Endurance/ADL function/Neuromuscular Reeducation/Exercises supervised and completed per the exercise flow sheet. Modalities Applied:  Patient declined ice post treatment.      Cotreatment by TIRSO Chaudhari      PT Exercise Log         Activity/Exercise Date  22    Bike 10 minutes     Slant Board 2 minutes      Balance Board 2 minutes     TKE     3x10     Knee Flexion at the TM     2 minutes   Shuttle    2B  3x10     SLR  3x10  2#   Stool Scoots   3 laps     Abd Walk Blue  3x10

## 2022-01-31 ENCOUNTER — OFFICE VISIT (OUTPATIENT)
Dept: ORTHOPEDIC SURGERY | Age: 67
End: 2022-01-31
Payer: COMMERCIAL

## 2022-01-31 DIAGNOSIS — Z98.890 STATUS POST ARTHROSCOPY OF RIGHT KNEE: Primary | ICD-10-CM

## 2022-01-31 PROCEDURE — 99024 POSTOP FOLLOW-UP VISIT: CPT | Performed by: ORTHOPAEDIC SURGERY

## 2022-01-31 NOTE — PROGRESS NOTES
Abner Urbina (: 1955) is a 61 y.o. female, patient, here for evaluation of the following chief complaint(s):  Knee Pain (right knee pain)       HPI:      Patient returns to the office now status post arthroscopy. She has been attending physical therapy on a regular basis. Patient is doing well. She states her pain is been reduced by greater than 80%    Allergies   Allergen Reactions    Neosporin [Benzalkonium Chloride] Rash     Facial rash, eye swelling    Other Medication Nausea and Vomiting and Other (comments)     SCALLOPS       Current Outpatient Medications   Medication Sig    buPROPion XL (WELLBUTRIN XL) 150 mg tablet Take 150 mg by mouth every morning.  traZODone (DESYREL) 100 mg tablet Take 100 mg by mouth nightly.  metFORMIN (GLUMETZA ER) 500 mg TG24 24 hour tablet Take  by mouth.  LORazepam (ATIVAN) 1 mg tablet Take 1 tablet by mouth daily as needed for Anxiety.  doxycycline (MONODOX) 100 mg capsule Take 100 mg by mouth two (2) times a day. Indications: LYME DISEASE PREVENTION    cholecalciferol, vitamin D3, (VITAMIN D3) 2,000 unit tab Take 4,000 Units by mouth daily.  valACYclovir (VALTREX) 1 g tablet Take 500 mg by mouth as needed.  DUAC 1-5 % topical gel as needed.  olmesartan (BENICAR) 40 mg tablet Take 20 mg by mouth every morning.  hydrochlorothiazide (HYDRODIURIL) 25 mg tablet Take 25 mg by mouth every morning.  simvastatin (ZOCOR) 20 mg tablet Take 1 Tab by mouth nightly.  MULTIVITAMINS (MULTIVITAMIN PO) Take 1 Tab by mouth every morning. No current facility-administered medications for this visit.        Past Medical History:   Diagnosis Date    Arthritis     Environmental allergies     Fatty liver     biopst proven    Hypertension     Obesity, morbid ([facility] [address]     Other and unspecified hyperlipidemia     Shingles     Unspecified adverse effect of anesthesia 2009    DIFFICULTY AWAKENING, R/T DILAUDID        Past Surgical History: Procedure Laterality Date    HX CHOLECYSTECTOMY      HX COLONOSCOPY  14    due 25    HX GI      liver biopsy    HX GI  2008    lap band    HX GI  2009    lap band removal    HX ORTHOPAEDIC  2013    right foot , tendon release    HX TONSILLECTOMY         Family History   Problem Relation Age of Onset    OSTEOARTHRITIS Mother     Heart Disease Father     Diabetes Father     Hypertension Father     Hypertension Brother     Anesth Problems Neg Hx         Social History     Socioeconomic History    Marital status:      Spouse name: Not on file    Number of children: Not on file    Years of education: Not on file    Highest education level: Not on file   Occupational History    Not on file   Tobacco Use    Smoking status: Never Smoker    Smokeless tobacco: Never Used   Substance and Sexual Activity    Alcohol use: Yes     Comment: occassionally    Drug use: No    Sexual activity: Not on file   Other Topics Concern    Not on file   Social History Narrative    Not on file     Social Determinants of Health     Financial Resource Strain:     Difficulty of Paying Living Expenses: Not on file   Food Insecurity:     Worried About Running Out of Food in the Last Year: Not on file    [place] of Food in the Last Year: Not on file   Transportation Needs:     Lack of Transportation (Medical): Not on file    Lack of Transportation (Non-Medical):  Not on file   Physical Activity:     Days of Exercise per Week: Not on file    Minutes of Exercise per Session: Not on file   Stress:     Feeling of Stress : Not on file   Social Connections:     Frequency of Communication with Friends and Family: Not on file    Frequency of Social Gatherings with Friends and Family: Not on file    Attends Judaism Services: Not on file    Active Member of Clubs or Organizations: Not on file    Attends Club or Organization Meetings: Not on file    Marital Status: Not on file   Intimate Partner Violence:     Fear of Current or Ex-Partner: Not on file    Emotionally Abused: Not on file    Physically Abused: Not on file    Sexually Abused: Not on file   Housing Stability:     Unable to Pay for Housing in the Last Year: Not on file    Number of Places Lived in the Last Year: Not on file    Unstable Housing in the Last Year: Not on file       Review of Systems    Vitals: There were no vitals taken for this visit. There is no height or weight on file to calculate BMI. Ortho Exam       Right knee: Portal sites of healed well. Patient has no effusion. Patient has near full range of motion of the knee. She has no crepitation. She has good quadricep tone and strength. Neurovascular examination is intact. ASSESSMENT/PLAN:      Patient is done quite well. I think at this stage, patient should be able to advance out of her structured physical therapy. Patient agrees with this. Have gone over home exercise program.  Patient is to return to the office as needed.       Ruth Ann Jim MD

## 2022-01-31 NOTE — LETTER
1/31/2022    Patient: Kelly De Leon   YOB: 1955   Date of Visit: 1/31/2022     Izabel Abreu MD  [address] [address]  Via Fax: [phone]    Dear Izabel Abreu MD,      Thank you for referring Ms. Tomi Mccarthy to [facility] for evaluation. My notes for this consultation are attached. If you have questions, please do not hesitate to call me. I look forward to following your patient along with you.       Sincerely,    Gregg Alba MD

## 2022-08-15 ENCOUNTER — TRANSCRIBE ORDER (OUTPATIENT)
Dept: SCHEDULING | Age: 67
End: 2022-08-15

## 2022-08-15 DIAGNOSIS — E04.2 NONTOXIC MULTINODULAR GOITER: Primary | ICD-10-CM

## 2022-08-23 ENCOUNTER — HOSPITAL ENCOUNTER (OUTPATIENT)
Dept: ULTRASOUND IMAGING | Age: 67
Discharge: HOME | End: 2022-08-23
Attending: INTERNAL MEDICINE
Payer: COMMERCIAL

## 2022-08-23 DIAGNOSIS — E04.2 NONTOXIC MULTINODULAR GOITER: ICD-10-CM

## 2022-08-23 PROCEDURE — 76536 US EXAM OF HEAD AND NECK: CPT

## 2023-10-10 ENCOUNTER — HOSPITAL ENCOUNTER (OUTPATIENT)
Facility: HOSPITAL | Age: 68
Discharge: HOME | End: 2023-10-13
Payer: COMMERCIAL

## 2023-10-10 DIAGNOSIS — Z12.31 VISIT FOR SCREENING MAMMOGRAM: ICD-10-CM

## 2023-10-10 PROCEDURE — 77063 BREAST TOMOSYNTHESIS BI: CPT

## 2024-02-07 ENCOUNTER — TRANSCRIBE ORDERS (OUTPATIENT)
Facility: HOSPITAL | Age: 69
End: 2024-02-07

## 2024-02-07 DIAGNOSIS — H05.89 ORBITAL LESION: Primary | ICD-10-CM

## 2024-02-19 ENCOUNTER — HOSPITAL ENCOUNTER (OUTPATIENT)
Age: 69
Discharge: HOME | End: 2024-02-22
Payer: COMMERCIAL

## 2024-02-19 DIAGNOSIS — E04.2 NONTOXIC MULTINODULAR GOITER: ICD-10-CM

## 2024-02-19 PROCEDURE — 76536 US EXAM OF HEAD AND NECK: CPT

## 2024-02-23 ENCOUNTER — HOSPITAL ENCOUNTER (OUTPATIENT)
Facility: HOSPITAL | Age: 69
Discharge: HOME | End: 2024-02-23
Attending: OPHTHALMOLOGY
Payer: COMMERCIAL

## 2024-02-23 DIAGNOSIS — H05.89 ORBITAL LESION: ICD-10-CM

## 2024-02-23 LAB — CREAT BLD-MCNC: 0.7 MG/DL (ref 0.6–1.3)

## 2024-02-23 PROCEDURE — 82565 ASSAY OF CREATININE: CPT

## 2024-02-23 PROCEDURE — 6360000004 HC RX CONTRAST MEDICATION: Performed by: RADIOLOGY

## 2024-02-23 PROCEDURE — 70481 CT ORBIT/EAR/FOSSA W/DYE: CPT

## 2024-02-23 RX ADMIN — IOPAMIDOL 100 ML: 612 INJECTION, SOLUTION INTRAVENOUS at 12:15

## 2024-10-03 ENCOUNTER — TRANSCRIBE ORDERS (OUTPATIENT)
Facility: HOSPITAL | Age: 69
End: 2024-10-03

## 2024-10-03 DIAGNOSIS — Z12.31 OTHER SCREENING MAMMOGRAM: Primary | ICD-10-CM

## 2024-10-24 ENCOUNTER — HOSPITAL ENCOUNTER (OUTPATIENT)
Facility: HOSPITAL | Age: 69
Discharge: HOME | End: 2024-10-27
Attending: OBSTETRICS & GYNECOLOGY
Payer: COMMERCIAL

## 2024-10-24 DIAGNOSIS — Z12.31 OTHER SCREENING MAMMOGRAM: ICD-10-CM

## 2024-10-24 PROCEDURE — 77063 BREAST TOMOSYNTHESIS BI: CPT

## 2025-08-19 ENCOUNTER — TRANSCRIBE ORDERS (OUTPATIENT)
Facility: HOSPITAL | Age: 70
End: 2025-08-19

## 2025-08-19 DIAGNOSIS — R94.01 NONSPECIFIC ABNORMAL ELECTROENCEPHALOGRAM (EEG): Primary | ICD-10-CM

## 2025-09-05 ENCOUNTER — HOSPITAL ENCOUNTER (OUTPATIENT)
Age: 70
Discharge: HOME | End: 2025-09-05
Payer: COMMERCIAL

## 2025-09-05 DIAGNOSIS — R94.01 NONSPECIFIC ABNORMAL ELECTROENCEPHALOGRAM (EEG): ICD-10-CM

## 2025-09-05 PROCEDURE — 76700 US EXAM ABDOM COMPLETE: CPT
